# Patient Record
Sex: MALE | URBAN - METROPOLITAN AREA
[De-identification: names, ages, dates, MRNs, and addresses within clinical notes are randomized per-mention and may not be internally consistent; named-entity substitution may affect disease eponyms.]

---

## 2022-08-04 ENCOUNTER — APPOINTMENT (OUTPATIENT)
Dept: URBAN - METROPOLITAN AREA SURGERY 12 | Age: 62
Setting detail: DERMATOLOGY
End: 2022-08-04

## 2022-08-04 DIAGNOSIS — D17 BENIGN LIPOMATOUS NEOPLASM: ICD-10-CM

## 2022-08-04 PROBLEM — D17.1 BENIGN LIPOMATOUS NEOPLASM OF SKIN AND SUBCUTANEOUS TISSUE OF TRUNK: Status: ACTIVE | Noted: 2022-08-04

## 2022-08-04 PROBLEM — D17.22 BENIGN LIPOMATOUS NEOPLASM OF SKIN AND SUBCUTANEOUS TISSUE OF LEFT ARM: Status: ACTIVE | Noted: 2022-08-04

## 2022-08-04 PROBLEM — D17.21 BENIGN LIPOMATOUS NEOPLASM OF SKIN AND SUBCUTANEOUS TISSUE OF RIGHT ARM: Status: ACTIVE | Noted: 2022-08-04

## 2022-08-04 PROCEDURE — OTHER EXCISION: OTHER

## 2022-08-04 PROCEDURE — 11402 EXC TR-EXT B9+MARG 1.1-2 CM: CPT

## 2022-08-04 PROCEDURE — 12031 INTMD RPR S/A/T/EXT 2.5 CM/<: CPT

## 2022-08-04 PROCEDURE — OTHER COUNSELING: OTHER

## 2022-08-04 ASSESSMENT — LOCATION SIMPLE DESCRIPTION DERM
LOCATION SIMPLE: LEFT FOREARM
LOCATION SIMPLE: ABDOMEN
LOCATION SIMPLE: RIGHT LOWER BACK
LOCATION SIMPLE: RIGHT FOREARM

## 2022-08-04 ASSESSMENT — LOCATION DETAILED DESCRIPTION DERM
LOCATION DETAILED: LEFT PROXIMAL DORSAL FOREARM
LOCATION DETAILED: PERIUMBILICAL SKIN
LOCATION DETAILED: RIGHT SUPERIOR MEDIAL MIDBACK
LOCATION DETAILED: RIGHT PROXIMAL DORSAL FOREARM
LOCATION DETAILED: LEFT VENTRAL LATERAL PROXIMAL FOREARM

## 2022-08-04 ASSESSMENT — LOCATION ZONE DERM
LOCATION ZONE: TRUNK
LOCATION ZONE: ARM

## 2022-08-04 NOTE — PROCEDURE: EXCISION
Patient Education       Well Child Exam 2 Months   About this topic   Your baby's 2-month well child exam is a visit with the doctor to check your baby's health. The doctor measures your child's weight, height, and head size. The doctor plots these numbers on a growth curve. The growth curve gives a picture of your baby's growth at each visit. The doctor may listen to your baby's heart, lungs, and belly. Your doctor will do a full exam of your baby from the head to the toes.  Your baby may also need shots or blood tests during this visit.  General   Growth and Development   Your doctor will ask you how your baby is developing. The doctor will focus on the skills that most children your child's age are expected to do. During the first months of your child's life, here are some things you can expect.  Movement - Your baby may:  Lift the head up when lying on the belly  Hold a small toy or rattle when you place it in the hand  Hearing, seeing, and talking - Your baby will likely:  Know your face and voice  Enjoy hearing you sing or talk  Start to smile at people  Begin making cooing sounds  Start to follow things with the eyes  Still have their eyes cross or wander from time to time  Act fussy if bored or activity doesnt change  Feeding - Your baby:  Needs breast milk or formula for nutrition. Always hold your baby when feeding. Do not prop a bottle. Propping the bottle makes it easier for your baby to choke and get ear infections.  Should not yet have baby cereal, juice, cows milk, or other food unless instructed by your doctor. Your baby's body is not ready for these foods yet. Your baby does not need to have water.  May needed burped often if your baby has problems with spitting up. Hold your baby upright for about an hour after feeding to help with spitting up.  May put hands in the mouth, root, or suck to show hunger  Should not be overfed. Turning away, closing the mouth, and relaxing arms are signs your baby  is full.  Sleep - Your child:  Sleeps for about 2 to 4 hours at a time. May start to sleep for longer stretches of time at night.  Is likely sleeping about 14 to 16 hours total out of each day, with 4 to 5 daytime naps.  May sleep better when swaddled. Monitor your baby when swaddled. Check to make sure your baby has not rolled over. Also, make sure the swaddle blanket has not come loose. Keep the swaddle blanket loose around your babys hips. Stop swaddling your baby before your baby starts to roll over. Most times, you will need to stop swaddling your baby by 2 months of age.  Should always sleep on the back, in your child's own bed, on a firm mattress  Vaccines - It is important for your baby to get vaccines on time. This protects from very serious illnesses like lung infections, meningitis, or infections that damage their nervous system. Most vaccines are given by shot, and others are given orally as a drink or pill. Your baby may need:  DTaP or diphtheria, tetanus, and pertussis vaccine  Hib or Haemophilus influenzae type b vaccine  IPV or polio vaccine  PCV or pneumococcal conjugate vaccine  RV or rotavirus vaccine  Hep B or hepatitis B vaccine  Some of these vaccines may be given as combined vaccines. This means your child may get fewer shots.  Help for Parents   Develop bathing, sleeping, feeding, napping, and playing routines.  Play with your baby.  Keep doing tummy time a few times each day while your baby is awake. Lie your baby on your chest and talk or sing to your baby. Put toys in front of your baby when lying on the tummy. This will encourage your baby to raise the head.  Talk or sing to your baby often. Respond when your baby makes sounds.  Use an infant gym or hold a toy slightly out of your baby's reach. This lets your baby look at it and reach for the toy.  Gently, clap your baby's hands or feet together. Rub them over different kinds of materials.  Slowly, move a toy in front of your baby's eyes  so your baby can follow the toy.  Here are some things you can do to help keep your baby safe and healthy.  Learn CPR and basic first aid.  Do not allow anyone to smoke in your home or around your baby. Second hand smoke can harm your baby.  Have the right size car seat for your baby and use it every time your baby is in the car. Your baby should be rear facing until 2 years of age.  Always place your baby on the back for sleep. Keep soft bedding, bumpers, loose blankets, and toys out of your baby's bed.  Keep one hand on your baby whenever you are changing a diaper or clothes to prevent falls.  Keep small toys and objects away from your baby.  Never leave your baby alone in the bath.  Keep your baby in the shade, rather than in the sun. Doctors do not recommend sunscreen until children are 6 months and older.  Parents need to think about:  A plan for going back to work or school  A reliable  or  provider  How to handle bouts of crying or colic. It is normal for your baby to have times that are hard to console. You need a plan for what to do if you are frustrated because it is never OK to shake a baby.  Making a routine for bedtime for your baby  The next well child visit will most likely be when your baby is 4 months old. At this visit your doctor may:  Do a full check up on your baby  Talk about how your baby is sleeping, if your baby has colic, teething, and how well you are coping with your baby  Give your baby the next set of shots       When do I need to call the doctor?   Fever of 100.4°F (38°C) or higher  Problems eating or spits up a lot  Legs and arms are very loose or floppy all the time  Legs and arms are very stiff  Won't stop crying  Doesn't blink or startle with loud sounds  Where can I learn more?   American Academy of Pediatrics  https://www.healthychildren.org/English/ages-stages/toddler/Pages/Milestones-During-The-First-2-Years.aspx   American Academy of  Pediatrics  https://www.healthychildren.org/English/ages-stages/baby/Pages/Hearing-and-Making-Sounds.aspx   Centers for Disease Control and Prevention  https://www.cdc.gov/ncbddd/actearly/milestones/   KidsHealth  https://kidshealth.org/en/parents/growth-2mos.html?ref=search   Last Reviewed Date   2021-05-06  Consumer Information Use and Disclaimer   This information is not specific medical advice and does not replace information you receive from your health care provider. This is only a brief summary of general information. It does NOT include all information about conditions, illnesses, injuries, tests, procedures, treatments, therapies, discharge instructions or life-style choices that may apply to you. You must talk with your health care provider for complete information about your health and treatment options. This information should not be used to decide whether or not to accept your health care providers advice, instructions or recommendations. Only your health care provider has the knowledge and training to provide advice that is right for you.  Copyright   Copyright © 2021 [employer] and its affiliates and/or licensors. All rights reserved.    Children under the age of 2 years will be restrained in a rear facing child safety seat.   If you have an active [facility] account, please look for your well child questionnaire to come to your [facility] account before your next well child visit.   Advancement Flap (Double) Text: The defect edges were debeveled with a #15 scalpel blade.  Given the location of the defect and the proximity to free margins a double advancement flap was deemed most appropriate.  Using a sterile surgical marker, the appropriate advancement flaps were drawn incorporating the defect and placing the expected incisions within the relaxed skin tension lines where possible.    The area thus outlined was incised deep to adipose tissue with a #15 scalpel blade.  The skin margins were undermined to an appropriate distance in all directions utilizing iris scissors.

## 2022-08-04 NOTE — PROCEDURE: COUNSELING
Detail Level: Zone
Patient Specific Counseling (Will Not Stick From Patient To Patient): Numerous lipomas scattered throughout arms and truck. Discussed surgical excision and advised that only 1-2 lesions will be treated at a time due to risk of infection and amount of anesthesia that can be used at a time

## 2022-08-04 NOTE — PROCEDURE: EXCISION
Crescentic Advancement Flap Text: The defect edges were debeveled with a #15 scalpel blade.  Given the location of the defect and the proximity to free margins a crescentic advancement flap was deemed most appropriate.  Using a sterile surgical marker, the appropriate advancement flap was drawn incorporating the defect and placing the expected incisions within the relaxed skin tension lines where possible.    The area thus outlined was incised deep to adipose tissue with a #15 scalpel blade.  The skin margins were undermined to an appropriate distance in all directions utilizing iris scissors. Same Histology In Subsequent Stages Text: The pattern and morphology of the tumor is as described in the first stage.